# Patient Record
Sex: MALE | URBAN - METROPOLITAN AREA
[De-identification: names, ages, dates, MRNs, and addresses within clinical notes are randomized per-mention and may not be internally consistent; named-entity substitution may affect disease eponyms.]

---

## 2019-03-06 ENCOUNTER — OFFICE VISIT (OUTPATIENT)
Dept: URBAN - METROPOLITAN AREA CLINIC 17 | Facility: CLINIC | Age: 79
End: 2019-03-06

## 2019-03-06 DIAGNOSIS — H35.62 RETINAL HEMORRHAGE, LEFT EYE: ICD-10-CM

## 2019-03-06 DIAGNOSIS — H20.042 SECONDARY NONINFECTIOUS IRIDOCYCLITIS, LEFT EYE: Primary | ICD-10-CM

## 2019-03-06 PROCEDURE — 99203 OFFICE O/P NEW LOW 30 MIN: CPT | Performed by: OPTOMETRIST

## 2019-03-06 RX ORDER — PREDNISOLONE ACETATE 10 MG/ML
1 % SUSPENSION/ DROPS OPHTHALMIC
Qty: 1 | Refills: 0 | Status: INACTIVE
Start: 2019-03-06 | End: 2020-03-17

## 2019-03-06 ASSESSMENT — INTRAOCULAR PRESSURE
OD: 27
OS: 29

## 2019-03-06 NOTE — IMPRESSION/PLAN
Impression: Primary open-angle glaucoma, bilateral, stage unspecified: H40.1130. Plan: Cont. all glc gtts, Continue care in [facility] in 3 wks.

## 2019-03-06 NOTE — IMPRESSION/PLAN
Impression: Retinal hemorrhage, left eye: H35.62.
s/p stroke of OS July 2018-[facility] has been poor since then Plan: Continue care in [facility]).

## 2019-03-06 NOTE — IMPRESSION/PLAN
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 Plan: Discussed Diagnosis with Pt. Pt to start Pred Acetate QID Pt to return in 1 wk for follow up/IOP check.

## 2019-03-14 ENCOUNTER — OFFICE VISIT (OUTPATIENT)
Dept: URBAN - METROPOLITAN AREA CLINIC 17 | Facility: CLINIC | Age: 79
End: 2019-03-14

## 2019-03-14 PROCEDURE — 99213 OFFICE O/P EST LOW 20 MIN: CPT | Performed by: OPTOMETRIST

## 2019-03-14 ASSESSMENT — INTRAOCULAR PRESSURE
OD: 14
OS: 13

## 2019-03-14 NOTE — IMPRESSION/PLAN
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 Plan: Discussed Diagnosis with Pt. Pt to taper Pred Acetate BID X 3 days then QD X 3 days

## 2020-03-17 ENCOUNTER — OFFICE VISIT (OUTPATIENT)
Dept: URBAN - METROPOLITAN AREA CLINIC 17 | Facility: CLINIC | Age: 80
End: 2020-03-17

## 2020-03-17 DIAGNOSIS — Z96.1 PRESENCE OF INTRAOCULAR LENS: ICD-10-CM

## 2020-03-17 DIAGNOSIS — H10.45 OTHER CHRONIC ALLERGIC CONJUNCTIVITIS: Primary | ICD-10-CM

## 2020-03-17 DIAGNOSIS — H26.492 OTHER SECONDARY CATARACT OF LEFT EYE: ICD-10-CM

## 2020-03-17 DIAGNOSIS — H40.1130 PRIMARY OPEN-ANGLE GLAUCOMA, BILATERAL, STAGE UNSPECIFIED: ICD-10-CM

## 2020-03-17 PROCEDURE — 99213 OFFICE O/P EST LOW 20 MIN: CPT | Performed by: OPTOMETRIST

## 2020-03-17 RX ORDER — PREDNISOLONE ACETATE 10 MG/ML
1 % SUSPENSION/ DROPS OPHTHALMIC
Qty: 1 | Refills: 0 | Status: INACTIVE
Start: 2020-03-17 | End: 2020-03-23

## 2020-03-17 ASSESSMENT — INTRAOCULAR PRESSURE
OS: 4
OD: 15

## 2020-03-17 NOTE — IMPRESSION/PLAN
Impression: Other chronic allergic conjunctivitis: H10.45. OS. Plan: Patient educated that symptoms are caused by ocular allergies and that treatment will help alleviate symptoms but that it will not prevent allergies. Patient educated that allergy testing with an allergy specialist may be necessary to identify allergens affecting patient and treat condition. Prescribed Prednisolone acetate 1% QID OU x 7-10 days, Pt May use Opcon A OTC for ocular itch.

## 2020-03-17 NOTE — IMPRESSION/PLAN
Impression: Primary open-angle glaucoma, bilateral, stage unspecified: H40.1130. Plan: Cont. all glc gtts, latanoprost QHS OU and brimonidine BID OU.